# Patient Record
Sex: FEMALE | Race: ASIAN | NOT HISPANIC OR LATINO | URBAN - METROPOLITAN AREA
[De-identification: names, ages, dates, MRNs, and addresses within clinical notes are randomized per-mention and may not be internally consistent; named-entity substitution may affect disease eponyms.]

---

## 2020-08-13 ENCOUNTER — EMERGENCY (EMERGENCY)
Facility: HOSPITAL | Age: 25
LOS: 1 days | Discharge: HOME | End: 2020-08-13
Attending: EMERGENCY MEDICINE
Payer: COMMERCIAL

## 2020-08-13 VITALS
RESPIRATION RATE: 18 BRPM | DIASTOLIC BLOOD PRESSURE: 77 MMHG | HEIGHT: 61 IN | SYSTOLIC BLOOD PRESSURE: 126 MMHG | OXYGEN SATURATION: 98 % | TEMPERATURE: 98 F | WEIGHT: 164.91 LBS | HEART RATE: 95 BPM

## 2020-08-13 VITALS
OXYGEN SATURATION: 100 % | DIASTOLIC BLOOD PRESSURE: 59 MMHG | RESPIRATION RATE: 18 BRPM | TEMPERATURE: 98 F | HEART RATE: 86 BPM | SYSTOLIC BLOOD PRESSURE: 107 MMHG

## 2020-08-13 PROCEDURE — 99284 EMERGENCY DEPT VISIT MOD MDM: CPT

## 2020-08-13 PROCEDURE — 99284 EMERGENCY DEPT VISIT MOD MDM: CPT | Mod: 25

## 2020-08-13 PROCEDURE — 93005 ELECTROCARDIOGRAM TRACING: CPT

## 2020-08-13 RX ORDER — ACETAMINOPHEN 500 MG
975 TABLET ORAL ONCE
Refills: 0 | Status: COMPLETED | OUTPATIENT
Start: 2020-08-13 | End: 2020-08-13

## 2020-08-13 RX ADMIN — Medication 975 MILLIGRAM(S): at 03:12

## 2020-08-13 NOTE — ED ADULT NURSE NOTE - NSIMPLEMENTINTERV_GEN_ALL_ED
Implemented All Universal Safety Interventions:  [place] to call system. Call bell, personal items and telephone within reach. Instruct patient to call for assistance. Room bathroom lighting operational. Non-slip footwear when patient is off stretcher. Physically safe environment: no spills, clutter or unnecessary equipment. Stretcher in lowest position, wheels locked, appropriate side rails in place.

## 2020-08-13 NOTE — ED ADULT NURSE NOTE - OBJECTIVE STATEMENT
pt reports she traveled from [place] to [place] today via air plane.  pt reports shortly after her arrival she started having midsternal chest pain 8/10, pt describes the pain as sharp.  pt denies any coughing , fevers, or shortness of breath.  pain is not reproducible with deep inspiration or palpation.  pt denies having any sick contacts recently.  pt is alert and oriented x3, speaking full clear sentences, respirations non-labored, skin warm dry and intact, strong pulses throughout, abd is soft and non-distended, pt is ambulating with a strong steady gait.

## 2020-08-13 NOTE — ED PROVIDER NOTE - PHYSICAL EXAMINATION
Exam:   General: Non toxic, well appearing  HENT: No pharyngeal erythema/exudate, external ear exam normal  Eyes: PEERL, EOMI  Lungs: CTA B/L, no wheezing, no rales, no ronchi  Chest: Normal Heart sounds, no murmurs, no rubs no gallops  Abdomen: Soft, no tenderness, no rigidity, no guarding, neg Rovsing's signs, neg tenderness at Mckburney's point  MSK: FROM of joints, no tenderness to palpation  Skin: No new rashes or lesions  Neuro: Alert and oriented x 3, power 5/5 x 4, CN II-XII GI, no facial asymmetry, no cerebellar findings

## 2020-08-13 NOTE — ED PROVIDER NOTE - CLINICAL SUMMARY MEDICAL DECISION MAKING FREE TEXT BOX
Attending Lexa:  24 y/o f no sig pmh, presents to ed w/ cc of headache that started several days ago and now having intermittent chest pain substernal that is non radiating. Lacy is frontal, moderate, has not taken anything for pain. Nothing makes it better or worse. chest pain described as burning worse with pressing down. pt states she wants to get tested for covid. No f/c, cough sob, neck pain, rash, photophobia, confusion, palpitations. Pt also reported to have recently come to [place] so she is concerned for covid. afebrile vitals stable, non toxic, wlel appearing, lungs cta b/l no m/r/g, no w/r/r. neg jimenez homans, no meningeal signs. pt likely has viral illness, no s/s consistent w/ menigitis. pt may be covid however discussed not testing pt's that are not being admitted to hospital. given recent flight and chest pain I did want to get a cxr, as well as d dimer, basic labs. pt declined this workup. I discussed that dvt/pe could be life threatening and in my medical opinion were indicated. I discussed if not diagnosed and treated a dvt/pe could lead to death or decreased quality of life. Pt still declined workup. Pt is alert and oriented x 3 and it is her right to decline medical workup. Pt was discharged and advised to fu with [facility] doctor. Also to act as if she is covid positive (also discussed neg test does not r/o), continued quarantine

## 2020-08-13 NOTE — ED PROVIDER NOTE - PATIENT PORTAL LINK FT
You can access the FollowMyHealth Patient Portal offered by [facility] by registering at the following website: [email or web address]. By joining [facility]’s FollowMyHealth portal, you will also be able to view your health information using other applications (apps) compatible with our system.

## 2020-08-13 NOTE — ED PROVIDER NOTE - OBJECTIVE STATEMENT
Attending Lexa:  26 y/o f no sig pmh, presents to ed w/ cc of headache that started several days ago and now having intermittent chest pain substernal that is non radiating. Lacy is frontal, moderate, has not taken anything for pain. Nothing makes it better or worse. chest pain described as burning worse with pressing down. pt states she wants to get tested for covid. No f/c, cough sob, neck pain, rash, photophobia, confusion, palpitations.

## 2020-08-13 NOTE — ED PROVIDER NOTE - NSFOLLOWUPINSTRUCTIONS_ED_ALL_ED_FT
Chest Pain    Chest pain can be caused by many different conditions which may or may not be dangerous. Causes include heartburn, lung infections, heart attack, blood clot in lungs, skin infections, strain or damage to muscle, cartilage, or bones, etc. In addition to a history and physical examination, an electrocardiogram (ECG) or other lab tests may have been performed to determine the cause of your chest pain. Follow up with your primary care provider or with a cardiologist as instructed.     SEEK IMMEDIATE MEDICAL CARE IF YOU HAVE ANY OF THE FOLLOWING SYMPTOMS: worsening chest pain, coughing up blood, unexplained back/neck/jaw pain, severe abdominal pain, dizziness or lightheadedness, fainting, shortness of breath, sweaty or clammy skin, vomiting, or racing heart beat. These symptoms may represent a serious problem that is an emergency. Do not wait to see if the symptoms will go away. Get medical help right away. Call 911 and do not drive yourself to the hospital.    Headache    A headache is pain or discomfort felt around the head or neck area. The specific cause of a headache may not be found as there are many types including tension headaches, migraine headaches, and cluster headaches. Watch your condition for any changes. Things you can do to manage your pain include taking over the counter and prescription medications as instructed by your health care provider, lying down in a dark quiet room, limiting stress, getting regular sleep, and refraining from alcohol and tobacco products.    SEEK IMMEDIATE MEDICAL CARE IF YOU HAVE ANY OF THE FOLLOWING SYMPTOMS: fever, vomiting, stiff neck, loss of vision, problems with speech, muscle weakness, loss of balance, trouble walking, passing out, or confusion.    [facility]  [employer] [facility]    Address: [id number] [place] [address]      Hours: Closed · Opens 8AM      Phone: [phone]

## 2020-08-13 NOTE — ED PROVIDER NOTE - NS ED ROS FT
General: No fevers, chills, malaise  Head: Pos HA  Eyes: No blurry vision, double vision, pain with eye movement  ENT: No runny nose, ear pain, difficulty hearing  Neck: No neck pain  Chest: Pos chest pain, neg sob, palpitations, wheezing  GI: No abd pain, n/v, diarrhea, bloody stool  Extremities: No arthralgias, myalgias  Skin: No new rashes/lesions  Neuro: No headache, weakness, difficulty with ambulation
